# Patient Record
Sex: FEMALE | Race: WHITE | NOT HISPANIC OR LATINO | URBAN - METROPOLITAN AREA
[De-identification: names, ages, dates, MRNs, and addresses within clinical notes are randomized per-mention and may not be internally consistent; named-entity substitution may affect disease eponyms.]

---

## 2018-10-10 ENCOUNTER — COMPLETE SKIN EXAM (OUTPATIENT)
Dept: URBAN - METROPOLITAN AREA CLINIC 16 | Facility: CLINIC | Age: 73
Setting detail: DERMATOLOGY
End: 2018-10-10

## 2018-10-10 DIAGNOSIS — L82.0 INFLAMED SEBORRHEIC KERATOSIS: ICD-10-CM

## 2018-10-10 PROBLEM — L57.0 ACTINIC KERATOSIS: Status: RESOLVED | Noted: 2018-10-10

## 2018-10-10 PROBLEM — Z85.828 PERSONAL HISTORY OF OTHER MALIGNANT NEOPLASM OF SKIN: Status: RESOLVED | Noted: 2018-10-10

## 2018-10-10 PROBLEM — D18.01 HEMANGIOMA OF SKIN AND SUBCUTANEOUS TISSUE: Status: RESOLVED | Noted: 2018-10-10

## 2018-10-10 PROBLEM — L82.1 OTHER SEBORRHEIC KERATOSIS: Status: RESOLVED | Noted: 2018-10-10

## 2018-10-10 PROCEDURE — 17004 DESTROY PREMAL LESIONS 15/>: CPT

## 2018-10-10 PROCEDURE — 99213 OFFICE O/P EST LOW 20 MIN: CPT

## 2019-10-16 ENCOUNTER — COMPLETE SKIN EXAM (OUTPATIENT)
Dept: URBAN - METROPOLITAN AREA CLINIC 18 | Facility: CLINIC | Age: 74
Setting detail: DERMATOLOGY
End: 2019-10-16

## 2019-10-16 DIAGNOSIS — Z48.817 ENCOUNTER FOR SURGICAL AFTERCARE FOLLOWING SURGERY ON THE SKIN AND SUBCUTANEOUS TISSUE: ICD-10-CM

## 2019-10-16 PROBLEM — L57.0 ACTINIC KERATOSIS: Status: RESOLVED | Noted: 2019-10-16

## 2019-10-16 PROBLEM — L82.1 OTHER SEBORRHEIC KERATOSIS: Status: RESOLVED | Noted: 2019-10-16

## 2019-10-16 PROBLEM — D18.01 HEMANGIOMA OF SKIN AND SUBCUTANEOUS TISSUE: Status: RESOLVED | Noted: 2019-10-16

## 2019-10-16 PROCEDURE — 99213 OFFICE O/P EST LOW 20 MIN: CPT

## 2019-10-16 PROCEDURE — 17003 DESTRUCT PREMALG LES 2-14: CPT

## 2019-10-16 PROCEDURE — 17000 DESTRUCT PREMALG LESION: CPT

## 2020-02-11 ENCOUNTER — SPOT (OUTPATIENT)
Dept: URBAN - METROPOLITAN AREA CLINIC 18 | Facility: CLINIC | Age: 75
Setting detail: DERMATOLOGY
End: 2020-02-11

## 2020-02-11 DIAGNOSIS — D48.5 NEOPLASM OF UNCERTAIN BEHAVIOR OF SKIN: ICD-10-CM

## 2020-02-11 PROBLEM — L57.0 ACTINIC KERATOSIS: Status: RESOLVED | Noted: 2020-02-11

## 2020-02-11 PROCEDURE — 99213 OFFICE O/P EST LOW 20 MIN: CPT

## 2020-02-11 PROCEDURE — 17004 DESTROY PREMAL LESIONS 15/>: CPT

## 2020-02-12 PROBLEM — L82.1 OTHER SEBORRHEIC KERATOSIS: Status: RESOLVED | Noted: 2020-02-12

## 2020-11-17 ENCOUNTER — FOLLOW-UP (OUTPATIENT)
Dept: URBAN - METROPOLITAN AREA CLINIC 18 | Facility: CLINIC | Age: 75
Setting detail: DERMATOLOGY
End: 2020-11-17

## 2020-11-17 DIAGNOSIS — B35.4 TINEA CORPORIS: ICD-10-CM

## 2020-11-17 DIAGNOSIS — L21.9 SEBORRHEIC DERMATITIS, UNSPECIFIED: ICD-10-CM

## 2020-11-17 PROBLEM — L82.1 OTHER SEBORRHEIC KERATOSIS: Status: RESOLVED | Noted: 2020-11-17

## 2020-11-17 PROBLEM — D18.01 HEMANGIOMA OF SKIN AND SUBCUTANEOUS TISSUE: Status: RESOLVED | Noted: 2020-11-17

## 2020-11-17 PROBLEM — L57.0 ACTINIC KERATOSIS: Status: RESOLVED | Noted: 2020-11-17

## 2020-11-17 PROCEDURE — 17004 DESTROY PREMAL LESIONS 15/>: CPT

## 2020-11-17 PROCEDURE — 99213 OFFICE O/P EST LOW 20 MIN: CPT

## 2021-10-11 ENCOUNTER — OTHER (OUTPATIENT)
Dept: URBAN - METROPOLITAN AREA CLINIC 18 | Facility: CLINIC | Age: 76
Setting detail: DERMATOLOGY
End: 2021-10-11

## 2021-10-11 ENCOUNTER — RX ONLY (RX ONLY)
Age: 76
End: 2021-10-11

## 2021-10-11 DIAGNOSIS — L57.0 ACTINIC KERATOSIS: ICD-10-CM

## 2021-10-11 PROCEDURE — 99213 OFFICE O/P EST LOW 20 MIN: CPT

## 2021-10-11 RX ORDER — CLOBETASOL PROPIONATE 0.5 MG/ML
A SMALL AMOUNT SOLUTION TOPICAL TWICE A DAY
Qty: 50 | Refills: 1
Start: 2021-10-11

## 2021-10-18 ENCOUNTER — SKIN CHECK (OUTPATIENT)
Dept: URBAN - METROPOLITAN AREA CLINIC 18 | Facility: CLINIC | Age: 76
Setting detail: DERMATOLOGY
End: 2021-10-18

## 2021-10-18 DIAGNOSIS — D48.5 NEOPLASM OF UNCERTAIN BEHAVIOR OF SKIN: ICD-10-CM

## 2021-10-18 PROCEDURE — 99214 OFFICE O/P EST MOD 30 MIN: CPT

## 2021-11-04 ENCOUNTER — APPOINTMENT (OUTPATIENT)
Dept: URBAN - METROPOLITAN AREA CLINIC 270 | Age: 76
Setting detail: DERMATOLOGY
End: 2021-11-06

## 2021-11-04 DIAGNOSIS — D485 NEOPLASM OF UNCERTAIN BEHAVIOR OF SKIN: ICD-10-CM

## 2021-11-04 DIAGNOSIS — L30.9 DERMATITIS, UNSPECIFIED: ICD-10-CM

## 2021-11-04 PROBLEM — D48.5 NEOPLASM OF UNCERTAIN BEHAVIOR OF SKIN: Status: ACTIVE | Noted: 2021-11-04

## 2021-11-04 PROCEDURE — 99203 OFFICE O/P NEW LOW 30 MIN: CPT | Mod: 25

## 2021-11-04 PROCEDURE — OTHER BIOPSY BY SHAVE METHOD: OTHER

## 2021-11-04 PROCEDURE — OTHER COUNSELING: OTHER

## 2021-11-04 PROCEDURE — OTHER ADDITIONAL NOTES: OTHER

## 2021-11-04 PROCEDURE — OTHER SEPARATE AND IDENTIFIABLE DOCUMENTATION: OTHER

## 2021-11-04 PROCEDURE — 11103 TANGNTL BX SKIN EA SEP/ADDL: CPT

## 2021-11-04 PROCEDURE — 11102 TANGNTL BX SKIN SINGLE LES: CPT

## 2021-11-04 ASSESSMENT — LOCATION DETAILED DESCRIPTION DERM
LOCATION DETAILED: LEFT ANTERIOR PROXIMAL UPPER ARM
LOCATION DETAILED: RIGHT ANTERIOR PROXIMAL UPPER ARM
LOCATION DETAILED: MIDDLE STERNUM
LOCATION DETAILED: LEFT INFERIOR MEDIAL UPPER BACK
LOCATION DETAILED: LEFT SUPRAPUBIC SKIN
LOCATION DETAILED: LEFT DISTAL POSTERIOR THIGH

## 2021-11-04 ASSESSMENT — SEVERITY ASSESSMENT: SEVERITY: MILD TO MODERATE

## 2021-11-04 ASSESSMENT — LOCATION SIMPLE DESCRIPTION DERM
LOCATION SIMPLE: CHEST
LOCATION SIMPLE: LEFT POSTERIOR THIGH
LOCATION SIMPLE: GROIN
LOCATION SIMPLE: LEFT UPPER BACK
LOCATION SIMPLE: RIGHT UPPER ARM
LOCATION SIMPLE: LEFT UPPER ARM

## 2021-11-04 ASSESSMENT — BSA RASH: BSA RASH: 40

## 2021-11-04 ASSESSMENT — LOCATION ZONE DERM
LOCATION ZONE: ARM
LOCATION ZONE: TRUNK
LOCATION ZONE: LEG

## 2021-11-04 NOTE — PROCEDURE: ADDITIONAL NOTES
Additional Notes: Advised pt would call in script when biopsy results were in. Pt verbalized understanding.
Render Risk Assessment In Note?: yes
Detail Level: Simple

## 2021-11-04 NOTE — PROCEDURE: BIOPSY BY SHAVE METHOD
Anesthesia Type: 1% lidocaine with epinephrine
Type Of Destruction Used: Curettage
Additional Anesthesia Volume In Cc (Will Not Render If 0): 0
Detail Level: Detailed
Size Of Lesion In Cm: 0.3
Hide Second Anesthesia?: No
Notification Instructions: Patient will be notified of biopsy results. However, patient instructed to call the office if not contacted within 2 weeks.
Silver Nitrate Text: The wound bed was treated with silver nitrate after the biopsy was performed.
Biopsy Method: Dermablade
Dressing: bandage
Curettage Text: The wound bed was treated with curettage after the biopsy was performed.
Electrodesiccation Text: The wound bed was treated with electrodesiccation after the biopsy was performed.
Wound Care: Petrolatum
Billing Type: Third-Party Bill
Was A Bandage Applied: Yes
Biopsy Type: H and E
Post-Care Instructions: I reviewed with the patient in detail post-care instructions. Patient is to keep the biopsy site dry overnight, and then apply bacitracin twice daily until healed. Patient may apply hydrogen peroxide soaks to remove any crusting.
Electrodesiccation And Curettage Text: The wound bed was treated with electrodesiccation and curettage after the biopsy was performed.
Cryotherapy Text: The wound bed was treated with cryotherapy after the biopsy was performed.
Hemostasis: Drysol
Depth Of Biopsy: dermis
Consent: Written consent was obtained and risks were reviewed including but not limited to scarring, infection, bleeding, scabbing, incomplete removal, nerve damage and allergy to anesthesia.
Information: Selecting Yes will display possible errors in your note based on the variables you have selected. This validation is only offered as a suggestion for you. PLEASE NOTE THAT THE VALIDATION TEXT WILL BE REMOVED WHEN YOU FINALIZE YOUR NOTE. IF YOU WANT TO FAX A PRELIMINARY NOTE YOU WILL NEED TO TOGGLE THIS TO 'NO' IF YOU DO NOT WANT IT IN YOUR FAXED NOTE.
Anesthesia Volume In Cc (Will Not Render If 0): 0.5
Size Of Lesion In Cm: 2

## 2021-11-16 ENCOUNTER — APPOINTMENT (OUTPATIENT)
Dept: URBAN - METROPOLITAN AREA CLINIC 270 | Age: 76
Setting detail: DERMATOLOGY
End: 2021-11-18

## 2021-11-16 DIAGNOSIS — L29.8 OTHER PRURITUS: ICD-10-CM

## 2021-11-16 DIAGNOSIS — F45.8 OTHER SOMATOFORM DISORDERS: ICD-10-CM

## 2021-11-16 PROCEDURE — 96372 THER/PROPH/DIAG INJ SC/IM: CPT

## 2021-11-16 PROCEDURE — 99213 OFFICE O/P EST LOW 20 MIN: CPT | Mod: 25

## 2021-11-16 PROCEDURE — OTHER COUNSELING: OTHER

## 2021-11-16 PROCEDURE — OTHER PRESCRIPTION: OTHER

## 2021-11-16 PROCEDURE — OTHER ADDITIONAL NOTES: OTHER

## 2021-11-16 PROCEDURE — OTHER INTRAMUSCULAR KENALOG: OTHER

## 2021-11-16 RX ORDER — HYDROXYZINE HYDROCHLORIDE 25 MG/1
TABLET, FILM COATED ORAL
Qty: 60 | Refills: 1 | Status: ERX | COMMUNITY
Start: 2021-11-16

## 2021-11-16 RX ORDER — TRIAMCINOLONE ACETONIDE 1 MG/G
CREAM TOPICAL BID
Qty: 1 | Refills: 0 | Status: ERX | COMMUNITY
Start: 2021-11-16

## 2021-11-16 ASSESSMENT — LOCATION SIMPLE DESCRIPTION DERM
LOCATION SIMPLE: LEFT POSTERIOR UPPER ARM
LOCATION SIMPLE: LEFT DELTOID
LOCATION SIMPLE: RIGHT POSTERIOR UPPER ARM
LOCATION SIMPLE: RIGHT FOREARM
LOCATION SIMPLE: LEFT FOREARM

## 2021-11-16 ASSESSMENT — LOCATION ZONE DERM
LOCATION ZONE: ARM
LOCATION ZONE: SHOULDER

## 2021-11-16 ASSESSMENT — LOCATION DETAILED DESCRIPTION DERM
LOCATION DETAILED: LEFT PROXIMAL POSTERIOR UPPER ARM
LOCATION DETAILED: LEFT DISTAL DORSAL FOREARM
LOCATION DETAILED: RIGHT PROXIMAL DORSAL FOREARM
LOCATION DETAILED: LEFT DELTOID
LOCATION DETAILED: RIGHT PROXIMAL POSTERIOR UPPER ARM

## 2021-11-16 NOTE — PROCEDURE: INTRAMUSCULAR KENALOG
Consent: The risks of atrophy were reviewed with the patient.
Kenalog Preparation: kenalog
Detail Level: None
Total Volume (Ccs): 1
Administered By (Optional): HÉCTOR
Concentration (Mg/Ml): 40.0
Add Option For Additional Mediation: No
Concentration (Mg/Ml) Of Additional Medication: 2.5

## 2021-11-16 NOTE — PROCEDURE: ADDITIONAL NOTES
Patient Management Risk Assessment: Moderate
Additional Notes: CAIN can not rule out anthropoid bite, spoke in depth about this with pt, she reports that the bugs are coming out of her skin, not biting her. Sending in N-acetycysteine 450mg BID, sent to [facility]. \\nUnderlying urticaria, drug eruption is a more likely cause of her symptoms. She recently experienced the loss of her son. Her previous dermatologist recommended her to follow up with therapists, she is adamant that the loss is not contributing to the bugs.
Detail Level: Simple
Render Risk Assessment In Note?: yes

## 2021-11-17 ENCOUNTER — RX ONLY (RX ONLY)
Age: 76
End: 2021-11-17

## 2021-11-17 RX ORDER — IVERMECTIN 3 MG/1
TABLET ORAL
Qty: 6 | Refills: 0 | Status: CANCELLED
Stop reason: SDUPTHER

## 2021-12-01 ENCOUNTER — RX ONLY (RX ONLY)
Age: 76
End: 2021-12-01

## 2021-12-01 RX ORDER — IVERMECTIN 3 MG/1
TABLET ORAL
Qty: 6 | Refills: 0 | Status: ERX | COMMUNITY
Start: 2021-12-01

## 2021-12-08 ENCOUNTER — APPOINTMENT (OUTPATIENT)
Dept: URBAN - METROPOLITAN AREA CLINIC 270 | Age: 76
Setting detail: DERMATOLOGY
End: 2021-12-08

## 2021-12-08 DIAGNOSIS — F45.8 OTHER SOMATOFORM DISORDERS: ICD-10-CM

## 2021-12-08 PROCEDURE — OTHER ADDITIONAL NOTES: OTHER

## 2021-12-08 PROCEDURE — 99213 OFFICE O/P EST LOW 20 MIN: CPT

## 2021-12-08 PROCEDURE — OTHER COUNSELING: OTHER

## 2021-12-08 ASSESSMENT — LOCATION SIMPLE DESCRIPTION DERM
LOCATION SIMPLE: RIGHT POSTERIOR UPPER ARM
LOCATION SIMPLE: LEFT POSTERIOR UPPER ARM
LOCATION SIMPLE: LEFT FOREARM
LOCATION SIMPLE: RIGHT FOREARM

## 2021-12-08 ASSESSMENT — LOCATION DETAILED DESCRIPTION DERM
LOCATION DETAILED: LEFT DISTAL DORSAL FOREARM
LOCATION DETAILED: LEFT PROXIMAL POSTERIOR UPPER ARM
LOCATION DETAILED: RIGHT PROXIMAL POSTERIOR UPPER ARM
LOCATION DETAILED: RIGHT PROXIMAL DORSAL FOREARM

## 2021-12-08 ASSESSMENT — LOCATION ZONE DERM: LOCATION ZONE: ARM

## 2021-12-08 NOTE — PROCEDURE: ADDITIONAL NOTES
Render Risk Assessment In Note?: yes
Patient Management Risk Assessment: Moderate
Additional Notes: She reports she notices great improvement, still some itching, but improving.
Detail Level: Simple
Additional Notes: CAIN can not rule out anthropoid bite, spoke in depth about this with pt, she reports that the bugs are coming out of her skin, not biting her. Sending in N-acetycysteine 450mg BID, sent to [facility]. \\nUnderlying urticaria, drug eruption is a more likely cause of her symptoms. She recently experienced the loss of her son. Her previous dermatologist recommended her to follow up with therapists, she is adamant that the loss is not contributing to the bugs.

## 2021-12-22 ENCOUNTER — APPOINTMENT (OUTPATIENT)
Dept: URBAN - METROPOLITAN AREA CLINIC 270 | Age: 76
Setting detail: DERMATOLOGY
End: 2021-12-22

## 2021-12-22 DIAGNOSIS — F45.8 OTHER SOMATOFORM DISORDERS: ICD-10-CM

## 2021-12-22 PROCEDURE — 99213 OFFICE O/P EST LOW 20 MIN: CPT

## 2021-12-22 PROCEDURE — OTHER ADDITIONAL NOTES: OTHER

## 2021-12-22 PROCEDURE — OTHER COUNSELING: OTHER

## 2021-12-22 ASSESSMENT — LOCATION DETAILED DESCRIPTION DERM
LOCATION DETAILED: RIGHT PROXIMAL POSTERIOR UPPER ARM
LOCATION DETAILED: RIGHT PROXIMAL DORSAL FOREARM
LOCATION DETAILED: LEFT PROXIMAL POSTERIOR UPPER ARM
LOCATION DETAILED: LEFT DISTAL DORSAL FOREARM

## 2021-12-22 ASSESSMENT — LOCATION ZONE DERM: LOCATION ZONE: ARM

## 2021-12-22 ASSESSMENT — LOCATION SIMPLE DESCRIPTION DERM
LOCATION SIMPLE: LEFT FOREARM
LOCATION SIMPLE: LEFT POSTERIOR UPPER ARM
LOCATION SIMPLE: RIGHT FOREARM
LOCATION SIMPLE: RIGHT POSTERIOR UPPER ARM

## 2021-12-22 NOTE — PROCEDURE: ADDITIONAL NOTES
Patient Management Risk Assessment: Moderate
Render Risk Assessment In Note?: yes
Additional Notes: CAIN can not rule out anthropoid bite, spoke in depth about this with pt, she reports that the bugs are coming out of her skin, not biting her. Sending in N-acetycysteine 450mg BID, sent to [facility]. \\nUnderlying urticaria, drug eruption is a more likely cause of her symptoms. She recently experienced the loss of her son. Her previous dermatologist recommended her to follow up with therapists, she is adamant that the loss is not contributing to the bugs.
Detail Level: Simple
Additional Notes: Pt reports that the rash is worsening and that the bugs are coming into her mouth and swarming her. \\nShe contacted  and they reported that they can not spray bugs that they can not see. \\nI advised her that she should follow up with infectious disease if she believe that there are bugs present. \\Sonya this time her rash does not appear to be caused by bugs, we spoke in depth about this, she reports she would like more ivermectin. I do not feel this is appropriate without a second opinion from infectious disease. \\nShe verbalized understanding. No further questions at this time